# Patient Record
Sex: MALE | Race: WHITE
[De-identification: names, ages, dates, MRNs, and addresses within clinical notes are randomized per-mention and may not be internally consistent; named-entity substitution may affect disease eponyms.]

---

## 2018-10-01 ENCOUNTER — HOSPITAL ENCOUNTER (OUTPATIENT)
Dept: HOSPITAL 47 - RADUSWWP | Age: 73
End: 2018-10-01
Attending: UROLOGY
Payer: MEDICARE

## 2018-10-01 DIAGNOSIS — R31.9: Primary | ICD-10-CM

## 2018-10-01 PROCEDURE — 76770 US EXAM ABDO BACK WALL COMP: CPT

## 2018-10-02 NOTE — US
EXAMINATION TYPE: US kidneys/renal and bladder

 

DATE OF EXAM: 10/1/2018

 

COMPARISON: NONE

 

CLINICAL HISTORY: R31.9 hematuria. Frequent urination.  Patient unable to hold bladder. 

 

EXAM MEASUREMENTS:

 

Right Kidney:  11.4 x 5.0 x 5.5 cm

Left Kidney: 11.4 x 5.8 x 6.4 cm

 

 

 

Right Kidney: No hydronephrosis or masses seen  

Left Kidney: Dromedary hump.    

Bladder: Possible two echogenic foci seen with shadow = 1.6 cm and 1.2 cm 

**Bilateral Jets seen

 

 

 

IMPRESSION:

No hydronephrosis or nephrolithiasis. There are echogenic foci within the bladder suspicious for blad
emerson calculi. This could be confirmed with CT scan.

## 2019-11-29 ENCOUNTER — HOSPITAL ENCOUNTER (OUTPATIENT)
Dept: HOSPITAL 47 - RADCTMAIN | Age: 74
Discharge: HOME | End: 2019-11-29
Attending: PSYCHIATRY & NEUROLOGY
Payer: MEDICARE

## 2019-11-29 DIAGNOSIS — Q28.2: Primary | ICD-10-CM

## 2019-11-29 DIAGNOSIS — E11.9: ICD-10-CM

## 2019-11-29 LAB — BUN SERPL-SCNC: 21 MG/DL (ref 9–20)

## 2019-11-29 PROCEDURE — 82565 ASSAY OF CREATININE: CPT

## 2019-11-29 PROCEDURE — 36415 COLL VENOUS BLD VENIPUNCTURE: CPT

## 2019-11-29 PROCEDURE — 84520 ASSAY OF UREA NITROGEN: CPT

## 2019-11-29 PROCEDURE — 70496 CT ANGIOGRAPHY HEAD: CPT

## 2019-12-02 NOTE — CT
EXAMINATION TYPE: CT angio head

 

DATE OF EXAM: 11/29/2019 5:07 PM

 

COMPARISON: Outside MRI [facility] September 11, 2019.

 

HISTORY: Abnormal MRI

 

CT DLP: 1501.7 mGycm

Automated exposure control for dose reduction was used.

 

TECHNIQUE: 

Performed without and with IV Contrast, patient injected with 100 mL of Isovue 370.

3D reconstructed images are created on an independent workstation and reviewed. 

 

FINDINGS: 

There is dominant left vertebral artery patent to basilar artery. There is hypoplastic or stenotic di
stal right vertebral artery. There is a patent left posterior communicating artery. There is hypoplas
tic right posterior communicating artery. There is no significant focal stenosis or aneurysmal change
 in the posterior circulation.

 

There is no significant focal stenosis or aneurysmal change in the anterior circulation. Hypoplastic 
anterior communicating artery is present.

 

Correlating to area of concern on recent MRI there is tangle of vessels or a vascular malformation in
volving the high right parietal region with serpiginous vessels and nodularity identified for referen
ce sagittal images 13 through 15, coronal images 21 through 24 and axial images 42 through 44 series 
6.

 

IMPRESSION: Confirmation of high right parietal AVM. Advise neurosurgical and/or endovascular referra
l to further evaluate.